# Patient Record
Sex: FEMALE | Race: ASIAN | NOT HISPANIC OR LATINO
[De-identification: names, ages, dates, MRNs, and addresses within clinical notes are randomized per-mention and may not be internally consistent; named-entity substitution may affect disease eponyms.]

---

## 2020-04-26 ENCOUNTER — MESSAGE (OUTPATIENT)
Age: 40
End: 2020-04-26

## 2021-04-13 ENCOUNTER — OUTPATIENT (OUTPATIENT)
Dept: OUTPATIENT SERVICES | Facility: HOSPITAL | Age: 41
LOS: 1 days | End: 2021-04-13
Payer: COMMERCIAL

## 2021-04-13 DIAGNOSIS — Z11.52 ENCOUNTER FOR SCREENING FOR COVID-19: ICD-10-CM

## 2021-04-13 PROCEDURE — U0003: CPT

## 2021-04-13 PROCEDURE — U0005: CPT

## 2021-04-13 PROCEDURE — C9803: CPT

## 2021-04-14 LAB — SARS-COV-2 RNA SPEC QL NAA+PROBE: SIGNIFICANT CHANGE UP

## 2021-07-21 ENCOUNTER — FORM ENCOUNTER (OUTPATIENT)
Age: 41
End: 2021-07-21

## 2021-07-22 ENCOUNTER — APPOINTMENT (OUTPATIENT)
Dept: OBGYN | Facility: CLINIC | Age: 41
End: 2021-07-22
Payer: COMMERCIAL

## 2021-07-22 ENCOUNTER — NON-APPOINTMENT (OUTPATIENT)
Age: 41
End: 2021-07-22

## 2021-07-22 ENCOUNTER — RESULT REVIEW (OUTPATIENT)
Age: 41
End: 2021-07-22

## 2021-07-22 ENCOUNTER — FORM ENCOUNTER (OUTPATIENT)
Age: 41
End: 2021-07-22

## 2021-07-22 PROCEDURE — 99214 OFFICE O/P EST MOD 30 MIN: CPT | Mod: 25

## 2021-07-22 PROCEDURE — 99386 PREV VISIT NEW AGE 40-64: CPT

## 2021-07-22 PROCEDURE — 99072 ADDL SUPL MATRL&STAF TM PHE: CPT

## 2021-07-31 ENCOUNTER — FORM ENCOUNTER (OUTPATIENT)
Age: 41
End: 2021-07-31

## 2021-08-01 ENCOUNTER — FORM ENCOUNTER (OUTPATIENT)
Age: 41
End: 2021-08-01

## 2021-08-06 ENCOUNTER — TRANSCRIPTION ENCOUNTER (OUTPATIENT)
Age: 41
End: 2021-08-06

## 2021-10-07 ENCOUNTER — FORM ENCOUNTER (OUTPATIENT)
Age: 41
End: 2021-10-07

## 2021-10-10 ENCOUNTER — FORM ENCOUNTER (OUTPATIENT)
Age: 41
End: 2021-10-10

## 2021-10-13 ENCOUNTER — FORM ENCOUNTER (OUTPATIENT)
Age: 41
End: 2021-10-13

## 2021-10-15 ENCOUNTER — RESULT REVIEW (OUTPATIENT)
Age: 41
End: 2021-10-15

## 2021-10-15 ENCOUNTER — OUTPATIENT (OUTPATIENT)
Dept: OUTPATIENT SERVICES | Facility: HOSPITAL | Age: 41
LOS: 1 days | End: 2021-10-15
Payer: COMMERCIAL

## 2021-10-15 ENCOUNTER — APPOINTMENT (OUTPATIENT)
Dept: ULTRASOUND IMAGING | Facility: CLINIC | Age: 41
End: 2021-10-15
Payer: COMMERCIAL

## 2021-10-15 DIAGNOSIS — Z00.8 ENCOUNTER FOR OTHER GENERAL EXAMINATION: ICD-10-CM

## 2021-10-15 PROCEDURE — 76856 US EXAM PELVIC COMPLETE: CPT | Mod: 26

## 2021-10-15 PROCEDURE — 76830 TRANSVAGINAL US NON-OB: CPT

## 2021-10-15 PROCEDURE — 76830 TRANSVAGINAL US NON-OB: CPT | Mod: 26

## 2021-10-15 PROCEDURE — 76856 US EXAM PELVIC COMPLETE: CPT

## 2021-10-21 DIAGNOSIS — Z78.9 OTHER SPECIFIED HEALTH STATUS: ICD-10-CM

## 2021-10-21 LAB — CYTOLOGY CVX/VAG DOC THIN PREP: NORMAL

## 2021-10-21 RX ORDER — IRON/IRON ASP GLY/FA/MV-MIN 38 125-25-1MG
TABLET ORAL
Refills: 0 | Status: ACTIVE | COMMUNITY

## 2021-10-29 ENCOUNTER — APPOINTMENT (OUTPATIENT)
Dept: OBGYN | Facility: CLINIC | Age: 41
End: 2021-10-29
Payer: COMMERCIAL

## 2021-10-29 ENCOUNTER — LABORATORY RESULT (OUTPATIENT)
Age: 41
End: 2021-10-29

## 2021-10-29 VITALS
WEIGHT: 165 LBS | HEIGHT: 63 IN | SYSTOLIC BLOOD PRESSURE: 100 MMHG | BODY MASS INDEX: 29.23 KG/M2 | DIASTOLIC BLOOD PRESSURE: 80 MMHG

## 2021-10-29 DIAGNOSIS — N92.0 EXCESSIVE AND FREQUENT MENSTRUATION WITH REGULAR CYCLE: ICD-10-CM

## 2021-10-29 DIAGNOSIS — N89.8 OTHER SPECIFIED NONINFLAMMATORY DISORDERS OF VAGINA: ICD-10-CM

## 2021-10-29 DIAGNOSIS — N93.9 ABNORMAL UTERINE AND VAGINAL BLEEDING, UNSPECIFIED: ICD-10-CM

## 2021-10-29 PROCEDURE — 99214 OFFICE O/P EST MOD 30 MIN: CPT

## 2021-10-29 RX ORDER — NORETHINDRONE ACETATE AND ETHINYL ESTRADIOL AND FERROUS FUMARATE 1.5-30(21)
1.5-3 KIT ORAL DAILY
Qty: 3 | Refills: 1 | Status: ACTIVE | COMMUNITY
Start: 2021-10-29 | End: 1900-01-01

## 2021-10-29 NOTE — PLAN
[FreeTextEntry1] : -I recommend Mirena IUD, pt hesitant, prefers to try OCP first. Oral contraceptive counseling provided to the patient.  Discussed risks and benefits, including thromboembolic events, especially in the setting of smoking, or in the setting of pre-existing medical conditions that predispose to adverse cardiovascular events.  Benign side effects reviewed as well as risk of unintended pregnancy.   Discussion regarding decreased contraceptive efficacy when taken with certain medications or when dosing schedule is erratic.  They do not protect against STI's. All questions answered.\par \par -f/u Heme recs\par -Vaginitis panel\par -RTO 6 months

## 2021-10-29 NOTE — PHYSICAL EXAM
[Appropriately responsive] : appropriately responsive [Labia Majora] : normal [Labia Minora] : normal [Normal] : normal [Uterine Adnexae] : normal

## 2021-10-29 NOTE — HISTORY OF PRESENT ILLNESS
[FreeTextEntry1] : 41 year old female present today with midcycle bleeding last month and continued heavy menses. h/o anemia. She had a pelvic US on 10/15/21 which showed 4 mm endometrial lining and normal ovaries. Her period in 9/2021 was very heavy and then in 10/2021 it came early and was very light. She notes that it's typical for her to get alternating heavy and light periods. She states that the tranexamic acid was helpful but her period is still very heavy. She has an appointment to see a hematologist 11/24/21. She has noticed an odor coming from her vagina since treatment for BV, which was evident on Pap 7/21.. Denies HTN, DM, HLD. Denies any FMHx of VTE.

## 2021-11-01 RX ORDER — METRONIDAZOLE 7.5 MG/G
0.75 GEL VAGINAL
Qty: 1 | Refills: 0 | Status: ACTIVE | COMMUNITY
Start: 2021-11-01 | End: 1900-01-01

## 2021-11-22 ENCOUNTER — OUTPATIENT (OUTPATIENT)
Dept: OUTPATIENT SERVICES | Facility: HOSPITAL | Age: 41
LOS: 1 days | Discharge: HOME | End: 2021-11-22

## 2021-11-22 DIAGNOSIS — D64.9 ANEMIA, UNSPECIFIED: ICD-10-CM

## 2021-11-24 ENCOUNTER — RESULT REVIEW (OUTPATIENT)
Age: 41
End: 2021-11-24

## 2021-11-24 ENCOUNTER — NON-APPOINTMENT (OUTPATIENT)
Age: 41
End: 2021-11-24

## 2021-11-24 ENCOUNTER — APPOINTMENT (OUTPATIENT)
Dept: HEMATOLOGY ONCOLOGY | Facility: CLINIC | Age: 41
End: 2021-11-24
Payer: COMMERCIAL

## 2021-11-24 VITALS
HEART RATE: 87 BPM | RESPIRATION RATE: 16 BRPM | WEIGHT: 175.25 LBS | DIASTOLIC BLOOD PRESSURE: 83 MMHG | HEIGHT: 62.36 IN | TEMPERATURE: 99.1 F | SYSTOLIC BLOOD PRESSURE: 134 MMHG | OXYGEN SATURATION: 99 % | BODY MASS INDEX: 31.84 KG/M2

## 2021-11-24 DIAGNOSIS — Z83.3 FAMILY HISTORY OF DIABETES MELLITUS: ICD-10-CM

## 2021-11-24 DIAGNOSIS — Z82.49 FAMILY HISTORY OF ISCHEMIC HEART DISEASE AND OTHER DISEASES OF THE CIRCULATORY SYSTEM: ICD-10-CM

## 2021-11-24 LAB
BASOPHILS # BLD AUTO: 0.13 K/UL — SIGNIFICANT CHANGE UP (ref 0–0.2)
BASOPHILS NFR BLD AUTO: 1.7 % — SIGNIFICANT CHANGE UP (ref 0–2)
EOSINOPHIL # BLD AUTO: 0.46 K/UL — SIGNIFICANT CHANGE UP (ref 0–0.5)
EOSINOPHIL NFR BLD AUTO: 6.1 % — HIGH (ref 0–6)
HCT VFR BLD CALC: 40.9 % — SIGNIFICANT CHANGE UP (ref 34.5–45)
HGB BLD-MCNC: 13.9 G/DL — SIGNIFICANT CHANGE UP (ref 11.5–15.5)
IMM GRANULOCYTES NFR BLD AUTO: 2.4 % — HIGH (ref 0–1.5)
LYMPHOCYTES # BLD AUTO: 1.97 K/UL — SIGNIFICANT CHANGE UP (ref 1–3.3)
LYMPHOCYTES # BLD AUTO: 25.9 % — SIGNIFICANT CHANGE UP (ref 13–44)
MCHC RBC-ENTMCNC: 28.5 PG — SIGNIFICANT CHANGE UP (ref 27–34)
MCHC RBC-ENTMCNC: 34 G/DL — SIGNIFICANT CHANGE UP (ref 32–36)
MCV RBC AUTO: 83.8 FL — SIGNIFICANT CHANGE UP (ref 80–100)
MONOCYTES # BLD AUTO: 0.7 K/UL — SIGNIFICANT CHANGE UP (ref 0–0.9)
MONOCYTES NFR BLD AUTO: 9.2 % — SIGNIFICANT CHANGE UP (ref 2–14)
NEUTROPHILS # BLD AUTO: 4.16 K/UL — SIGNIFICANT CHANGE UP (ref 1.8–7.4)
NEUTROPHILS NFR BLD AUTO: 54.7 % — SIGNIFICANT CHANGE UP (ref 43–77)
NRBC # BLD: 0 /100 WBCS — SIGNIFICANT CHANGE UP (ref 0–0)
PLATELET # BLD AUTO: 245 K/UL — SIGNIFICANT CHANGE UP (ref 150–400)
RBC # BLD: 4.88 M/UL — SIGNIFICANT CHANGE UP (ref 3.8–5.2)
RBC # FLD: 13.7 % — SIGNIFICANT CHANGE UP (ref 10.3–14.5)
WBC # BLD: 7.6 K/UL — SIGNIFICANT CHANGE UP (ref 3.8–10.5)
WBC # FLD AUTO: 7.6 K/UL — SIGNIFICANT CHANGE UP (ref 3.8–10.5)

## 2021-11-24 PROCEDURE — 99214 OFFICE O/P EST MOD 30 MIN: CPT

## 2021-11-24 NOTE — REVIEW OF SYSTEMS
[Negative] : Allergic/Immunologic [Dysuria] : no dysuria [Incontinence] : no incontinence [Vaginal Discharge] : no vaginal discharge [Skin Rash] : no skin rash [Skin Wound] : no skin wound [FreeTextEntry8] : heavy menstrual bleeding [de-identified] : thinning of nails and hair

## 2021-11-24 NOTE — ASSESSMENT
[FreeTextEntry1] : 42 yo F with no significant PMH who presents as a new consultation for anemia, likely iron deficiency.\par \par #Iron Deficiency Anemia\par - Likely from combination of heavy menstrual periods and diet (keto/intermittent fasting)\par - Pelvic ultrasound Oct 2021 showed no abnormalities\par - Was Hb 7.4 with MCV 66 in June 2021, started on PO iron 65 mg elemental iron, 5 tabs TID since\par - Hb improved to 9.4 in July 2021\par - Repeat CBC today shows Hb markedly improved to 13.9 with MCV 83.8\par - B12/folate normal in July\par - Iron low with %sat of 9, iron at 31, ferritin at 78.8 in July (likely normal due to already taking iron at that time)\par - Patient had been taking 15 iron pills (325 mg FS) daily. Told to stop iron except take 1 pill BID during the week when she has her menstrual period\par - Will also recheck iron studies today\par - RTC in 3-4 months for repeat CBC and iron studies\par \par Patient seen with and case discussed with Dr. Kelley\par \par Aryles Hedjar, MD, PGY-4\par Hematology/Oncology Fellow\par [facility]

## 2021-11-24 NOTE — HISTORY OF PRESENT ILLNESS
[de-identified] : 40 yo F with no significant PMH who presents as a new consultation for anemia.\par She had a Hb of 7.4 on 6/29/21 with MCV 66. She was started on PO iron, 65 mg elemental iron, taking 5 pills three times a day.\par She notes her menstrual periods have been alternatively heavy for a long time, alternating between heavy and light, although maintaining a normal cycle.\par About 3 years ago, she started on a keto diet. Then about 1.5 years ago, she changed to intermittent fasting. She generally eats chicken, salads, whole grain breads, and fruits for intermittent fasting (fasts 16 hours per day, eats 8 hours per day), but it did not seem to be helping her weight come down so now she is doing less intermittent fasting than before.\par She denies fevers, chills, nausea, vomiting, CP, SOB, diarrhea, bleeding in her stool, or weight loss.\par Her Hb improved to 9.4 in July with an MCV of 72.4. She was told to continue the PO iron.\par \par She has three children and was anemic during all those pregnancies. She was as low as a Hb ~ 6 during her first pregnancy and required iron infusions during that time, but otherwise has not had anemia. She is not sure what her blood counts were prior to June.

## 2021-11-29 LAB
FERRITIN SERPL-MCNC: 49 NG/ML
IRON SATN MFR SERPL: 93 %
IRON SERPL-MCNC: 293 UG/DL
TIBC SERPL-MCNC: 315 UG/DL
UIBC SERPL-MCNC: 22 UG/DL

## 2022-03-09 ENCOUNTER — OUTPATIENT (OUTPATIENT)
Dept: OUTPATIENT SERVICES | Facility: HOSPITAL | Age: 42
LOS: 1 days | Discharge: HOME | End: 2022-03-09

## 2022-03-09 DIAGNOSIS — D64.9 ANEMIA, UNSPECIFIED: ICD-10-CM

## 2022-03-10 ENCOUNTER — RESULT REVIEW (OUTPATIENT)
Age: 42
End: 2022-03-10

## 2022-03-10 ENCOUNTER — APPOINTMENT (OUTPATIENT)
Dept: HEMATOLOGY ONCOLOGY | Facility: CLINIC | Age: 42
End: 2022-03-10
Payer: COMMERCIAL

## 2022-03-10 VITALS
TEMPERATURE: 98.1 F | BODY MASS INDEX: 33.64 KG/M2 | HEART RATE: 78 BPM | OXYGEN SATURATION: 99 % | SYSTOLIC BLOOD PRESSURE: 142 MMHG | WEIGHT: 186.07 LBS | DIASTOLIC BLOOD PRESSURE: 92 MMHG | RESPIRATION RATE: 16 BRPM

## 2022-03-10 DIAGNOSIS — D50.9 IRON DEFICIENCY ANEMIA, UNSPECIFIED: ICD-10-CM

## 2022-03-10 LAB
BASOPHILS # BLD AUTO: 0.11 K/UL — SIGNIFICANT CHANGE UP (ref 0–0.2)
BASOPHILS NFR BLD AUTO: 1.4 % — SIGNIFICANT CHANGE UP (ref 0–2)
EOSINOPHIL # BLD AUTO: 0.35 K/UL — SIGNIFICANT CHANGE UP (ref 0–0.5)
EOSINOPHIL NFR BLD AUTO: 4.4 % — SIGNIFICANT CHANGE UP (ref 0–6)
HCT VFR BLD CALC: 40.3 % — SIGNIFICANT CHANGE UP (ref 34.5–45)
HGB BLD-MCNC: 13.3 G/DL — SIGNIFICANT CHANGE UP (ref 11.5–15.5)
IMM GRANULOCYTES NFR BLD AUTO: 0.3 % — SIGNIFICANT CHANGE UP (ref 0–1.5)
LYMPHOCYTES # BLD AUTO: 2.48 K/UL — SIGNIFICANT CHANGE UP (ref 1–3.3)
LYMPHOCYTES # BLD AUTO: 31.4 % — SIGNIFICANT CHANGE UP (ref 13–44)
MCHC RBC-ENTMCNC: 28.1 PG — SIGNIFICANT CHANGE UP (ref 27–34)
MCHC RBC-ENTMCNC: 33 G/DL — SIGNIFICANT CHANGE UP (ref 32–36)
MCV RBC AUTO: 85 FL — SIGNIFICANT CHANGE UP (ref 80–100)
MONOCYTES # BLD AUTO: 0.56 K/UL — SIGNIFICANT CHANGE UP (ref 0–0.9)
MONOCYTES NFR BLD AUTO: 7.1 % — SIGNIFICANT CHANGE UP (ref 2–14)
NEUTROPHILS # BLD AUTO: 4.38 K/UL — SIGNIFICANT CHANGE UP (ref 1.8–7.4)
NEUTROPHILS NFR BLD AUTO: 55.4 % — SIGNIFICANT CHANGE UP (ref 43–77)
NRBC # BLD: 0 /100 WBCS — SIGNIFICANT CHANGE UP (ref 0–0)
PLATELET # BLD AUTO: 271 K/UL — SIGNIFICANT CHANGE UP (ref 150–400)
RBC # BLD: 4.74 M/UL — SIGNIFICANT CHANGE UP (ref 3.8–5.2)
RBC # FLD: 13.9 % — SIGNIFICANT CHANGE UP (ref 10.3–14.5)
WBC # BLD: 7.9 K/UL — SIGNIFICANT CHANGE UP (ref 3.8–10.5)
WBC # FLD AUTO: 7.9 K/UL — SIGNIFICANT CHANGE UP (ref 3.8–10.5)

## 2022-03-10 PROCEDURE — 99213 OFFICE O/P EST LOW 20 MIN: CPT

## 2022-03-10 NOTE — ASSESSMENT
[FreeTextEntry1] : 40 yo F with no significant PMH who presents for followup for iron deficiency anemia, improved with oral iron supplementation\par Pt currently on 1 pill BID during the week when she has her menstrual period\par Hgb today is 13.3 - CBC reviewed with pt. f/u iron studies today\par She has f/u w/ PMD in 7/2022 -fax number provided for pt to send over lab results\par RTC end of the year\par All questions answered\par

## 2022-03-10 NOTE — REVIEW OF SYSTEMS
[Negative] : Allergic/Immunologic [Dysuria] : no dysuria [Incontinence] : no incontinence [Vaginal Discharge] : no vaginal discharge [Skin Rash] : no skin rash [Skin Wound] : no skin wound [FreeTextEntry8] : heavy menstrual bleeding [de-identified] : thinning of nails and hair

## 2022-03-10 NOTE — HISTORY OF PRESENT ILLNESS
[de-identified] : 42 yo F with no significant PMH who presents as a new consultation for anemia.\par She had a Hb of 7.4 on 6/29/21 with MCV 66. She was started on PO iron, 65 mg elemental iron, taking 5 pills three times a day.\par She notes her menstrual periods have been alternatively heavy for a long time, alternating between heavy and light, although maintaining a normal cycle.\par About 3 years ago, she started on a keto diet. Then about 1.5 years ago, she changed to intermittent fasting. She generally eats chicken, salads, whole grain breads, and fruits for intermittent fasting (fasts 16 hours per day, eats 8 hours per day), but it did not seem to be helping her weight come down so now she is doing less intermittent fasting than before.\par She denies fevers, chills, nausea, vomiting, CP, SOB, diarrhea, bleeding in her stool, or weight loss.\par Her Hb improved to 9.4 in July with an MCV of 72.4. She was told to continue the PO iron.\par \par She has three children and was anemic during all those pregnancies. She was as low as a Hb ~ 6 during her first pregnancy and required iron infusions during that time, but otherwise has not had anemia. She is not sure what her blood counts were prior to June. [de-identified] : She has been taking oral iron 1 pill BID during the week when she has her menstrual period. Tolerating well -no complaints. \par She has been trying to lose weight but has been unable to. Also notes continued hair thinning and brittle nails. States she had TSH checked by her PMD in July last year.

## 2022-03-11 LAB
FERRITIN SERPL-MCNC: 28 NG/ML
IRON SATN MFR SERPL: 14 %
IRON SERPL-MCNC: 47 UG/DL
TIBC SERPL-MCNC: 330 UG/DL
UIBC SERPL-MCNC: 283 UG/DL

## 2022-12-05 DIAGNOSIS — N91.2 AMENORRHEA, UNSPECIFIED: ICD-10-CM

## 2023-01-24 ENCOUNTER — APPOINTMENT (OUTPATIENT)
Dept: OBGYN | Facility: CLINIC | Age: 43
End: 2023-01-24
Payer: COMMERCIAL

## 2023-01-24 VITALS
SYSTOLIC BLOOD PRESSURE: 161 MMHG | WEIGHT: 199 LBS | HEIGHT: 62 IN | OXYGEN SATURATION: 99 % | BODY MASS INDEX: 36.62 KG/M2 | DIASTOLIC BLOOD PRESSURE: 106 MMHG | HEART RATE: 73 BPM

## 2023-01-24 DIAGNOSIS — Z11.51 ENCOUNTER FOR SCREENING FOR HUMAN PAPILLOMAVIRUS (HPV): ICD-10-CM

## 2023-01-24 DIAGNOSIS — Z86.2 PERSONAL HISTORY OF DISEASES OF THE BLOOD AND BLOOD-FORMING ORGANS AND CERTAIN DISORDERS INVOLVING THE IMMUNE MECHANISM: ICD-10-CM

## 2023-01-24 PROCEDURE — 99396 PREV VISIT EST AGE 40-64: CPT

## 2023-01-24 RX ORDER — NORGESTREL AND ETHINYL ESTRADIOL 0.3-0.03MG
0.3-3 KIT ORAL DAILY
Qty: 3 | Refills: 1 | Status: ACTIVE | COMMUNITY
Start: 2023-01-24 | End: 1900-01-01

## 2023-01-24 NOTE — HISTORY OF PRESENT ILLNESS
[Patient reported mammogram was normal] : Patient reported mammogram was normal [FreeTextEntry1] : 43 y/o  LMP 22 presents for routine annual GYN exam w/ c/o abnormal bleeding. She reports getting her period twice this month, with light bleeding on 23, and full flow menses starting 23. December period was 10 days late. Pt believes her mother had early menopause in her 40s.  Last annual on 2021, negative PAP and HPV. Last seen in 10/2021 with midcycle bleeding lasting a month and continued heavy menses. Pelvic sono was normal. Pt has a h/o anemia. She was prescribed Tranexamic acid. Saw hematology, and was prescribed oral iron. Pt has tried copper and Mirena IUD before, but c/o pelvic pressure. Her eldest child is going to [facility]. Pt reports getting a normal mammogram about 7 months ago.\par \par She works for [employer].\par \par ObHx:  x3\par GYNHx: heavy menses\par PMHx: anemia\par Allergies: NKDA [Mammogramdate] : 06/2022 [TextBox_19] : [facility] [BreastSonogramDate] : 06/2022 [TextBox_25] : [facility] [PapSmeardate] : 07/2021 [TextBox_31] : wnl

## 2023-01-24 NOTE — END OF VISIT
[FreeTextEntry2] : I, Radha Mcallister, acted as a scribe on behalf of Dr. Shannan Mensah on 01/24/2023 .\par \par All medical entries made by the scribe were at my, Dr. Shannan Mensah, direction and personally dictated by me on 01/24/2023. I have reviewed the chart and agree that the record accurately reflects my personal performance of the history, physical exam, assessment and plan. I have also personally directed, reviewed, and agreed with the chart.\par

## 2023-01-24 NOTE — PLAN
[FreeTextEntry1] : 43 y/o  presents for routine annual GYN exam. \par \par -PAP/HPV done today, pt counseled PAP may be unsatisfactory due to bleeding\par -rx mammo/sono\par -BSE\par -discussed treatment for heavy menses. I counseled the patient regarding treatment options for heavy menstrual bleeding including tranexamic acid and hormonal options, specifically combined oral contraceptive pills, patch and ring, all with option for extended cycle or continuous dosing, and levonorgestrel intrauterine device.  I reviewed the risks, potential side effects and benefits of each.\par  Pt reports bad experiences with Mirena, would like to try OCP\par -RTO in 1 year

## 2023-01-27 LAB — HPV HIGH+LOW RISK DNA PNL CVX: NOT DETECTED

## 2023-03-27 ENCOUNTER — APPOINTMENT (OUTPATIENT)
Dept: OBGYN | Facility: CLINIC | Age: 43
End: 2023-03-27
Payer: COMMERCIAL

## 2023-03-27 VITALS
BODY MASS INDEX: 36.62 KG/M2 | DIASTOLIC BLOOD PRESSURE: 85 MMHG | WEIGHT: 199 LBS | SYSTOLIC BLOOD PRESSURE: 141 MMHG | HEIGHT: 62 IN

## 2023-03-27 DIAGNOSIS — R87.615 UNSATISFACTORY CYTOLOGIC SMEAR OF CERVIX: ICD-10-CM

## 2023-03-27 PROCEDURE — 99212 OFFICE O/P EST SF 10 MIN: CPT

## 2023-03-27 NOTE — PLAN
[FreeTextEntry1] : pap collected and sent today, normal exam, no complications. \par rto for routine care.\par During this visit 15 minutes were spent face-to-face with greater than 50% of the time dedicated to counseling.\par

## 2023-03-27 NOTE — PHYSICAL EXAM
[Chaperone Declined] : Patient declined chaperone [Appropriately responsive] : appropriately responsive [Alert] : alert [No Acute Distress] : no acute distress [Oriented x3] : oriented x3 [Labia Majora] : normal [Labia Minora] : normal [Normal] : normal [Uterine Adnexae] : normal

## 2023-03-27 NOTE — HISTORY OF PRESENT ILLNESS
[FreeTextEntry1] : 44 y/o pt presents today for a re-pap. pt lmp 03/16/23. Last pap unsatisfactory due to menses done by Dr. Mensah 1/24/2023, HPV neg at that time. No hx abnormal paps. No other complaints.

## 2023-03-30 LAB — CYTOLOGY CVX/VAG DOC THIN PREP: NORMAL

## 2023-07-21 DIAGNOSIS — R92.2 INCONCLUSIVE MAMMOGRAM: ICD-10-CM

## 2023-07-21 DIAGNOSIS — Z12.39 ENCOUNTER FOR OTHER SCREENING FOR MALIGNANT NEOPLASM OF BREAST: ICD-10-CM

## 2023-11-03 ENCOUNTER — APPOINTMENT (OUTPATIENT)
Age: 43
End: 2023-11-03

## 2024-01-25 ENCOUNTER — TRANSCRIPTION ENCOUNTER (OUTPATIENT)
Age: 44
End: 2024-01-25

## 2024-01-26 ENCOUNTER — TRANSCRIPTION ENCOUNTER (OUTPATIENT)
Age: 44
End: 2024-01-26

## 2024-03-21 ENCOUNTER — APPOINTMENT (OUTPATIENT)
Dept: OBGYN | Facility: CLINIC | Age: 44
End: 2024-03-21
Payer: COMMERCIAL

## 2024-03-21 VITALS
HEIGHT: 63 IN | BODY MASS INDEX: 34.73 KG/M2 | WEIGHT: 196 LBS | DIASTOLIC BLOOD PRESSURE: 111 MMHG | SYSTOLIC BLOOD PRESSURE: 175 MMHG

## 2024-03-21 DIAGNOSIS — Z01.419 ENCOUNTER FOR GYNECOLOGICAL EXAMINATION (GENERAL) (ROUTINE) W/OUT ABNORMAL FINDINGS: ICD-10-CM

## 2024-03-21 DIAGNOSIS — Z00.00 ENCOUNTER FOR GENERAL ADULT MEDICAL EXAMINATION W/OUT ABNORMAL FINDINGS: ICD-10-CM

## 2024-03-21 DIAGNOSIS — Z12.31 ENCOUNTER FOR SCREENING MAMMOGRAM FOR MALIGNANT NEOPLASM OF BREAST: ICD-10-CM

## 2024-03-21 PROCEDURE — 99396 PREV VISIT EST AGE 40-64: CPT

## 2024-03-21 NOTE — HISTORY OF PRESENT ILLNESS
[FreeTextEntry1] : 44 year old  female presents for an annual. Last seen 2023 for annual - had AUB and unsatisfactory pap (repeat was nml) Reports irregular periods, occasionally heavy. Notes she is struggling with trying to lose weight.  OBHx:  x3 [Mammogramdate] : 07/23

## 2024-03-21 NOTE — PLAN
[FreeTextEntry1] : 44 year old female presents for an annual  -PAP utd -Check CBC and iron studies -Repeat mammo/sono in July -Advised colonoscopy, referral given to GI -pt counseled her blood pressure is extremely elevated, advised follow up with PCP ASAP  RTO in 1 year

## 2024-03-21 NOTE — END OF VISIT
[FreeTextEntry3] : I, Ange Brandyn, acted as a scribe on behalf of Dr. Shannan Mensah M.D. on 03/21/2024.  All medical entries made by the scribe were at my, Dr. Shannan Mensah M.D., direction and personally dictated by me on 03/21/2024. I have reviewed the chart and agree that the record accurately reflects my personal performance of the history, physical exam, assessment and plan. I have also personally directed, reviewed, and agreed with the chart.

## 2024-03-21 NOTE — PHYSICAL EXAM
[Appropriately responsive] : appropriately responsive [No Acute Distress] : no acute distress [Alert] : alert [Soft] : soft [Non-tender] : non-tender [Non-distended] : non-distended [No HSM] : No HSM [No Lesions] : no lesions [No Mass] : no mass [Oriented x3] : oriented x3 [Labia Majora] : normal [Labia Minora] : normal [Uterine Adnexae] : normal [Examination Of The Breasts] : a normal appearance [Normal] : normal [No Masses] : no breast masses were palpable

## 2024-03-22 LAB
BASOPHILS # BLD AUTO: 0.1 K/UL
BASOPHILS NFR BLD AUTO: 1 %
EOSINOPHIL # BLD AUTO: 0.33 K/UL
EOSINOPHIL NFR BLD AUTO: 3.2 %
FERRITIN SERPL-MCNC: 10 NG/ML
HCT VFR BLD CALC: 34.9 %
HGB BLD-MCNC: 10 G/DL
IMM GRANULOCYTES NFR BLD AUTO: 0.3 %
IRON SATN MFR SERPL: 4 %
IRON SERPL-MCNC: 16 UG/DL
LYMPHOCYTES # BLD AUTO: 2.96 K/UL
LYMPHOCYTES NFR BLD AUTO: 28.7 %
MAN DIFF?: NORMAL
MCHC RBC-ENTMCNC: 20.3 PG
MCHC RBC-ENTMCNC: 28.7 GM/DL
MCV RBC AUTO: 70.9 FL
MONOCYTES # BLD AUTO: 0.75 K/UL
MONOCYTES NFR BLD AUTO: 7.3 %
NEUTROPHILS # BLD AUTO: 6.14 K/UL
NEUTROPHILS NFR BLD AUTO: 59.5 %
PLATELET # BLD AUTO: 400 K/UL
RBC # BLD: 4.92 M/UL
RBC # FLD: 18.6 %
TIBC SERPL-MCNC: 430 UG/DL
UIBC SERPL-MCNC: 414 UG/DL
WBC # FLD AUTO: 10.31 K/UL

## 2024-04-16 ENCOUNTER — OUTPATIENT (OUTPATIENT)
Dept: OUTPATIENT SERVICES | Facility: HOSPITAL | Age: 44
LOS: 1 days | End: 2024-04-16

## 2024-04-16 ENCOUNTER — APPOINTMENT (OUTPATIENT)
Dept: INTERNAL MEDICINE | Facility: CLINIC | Age: 44
End: 2024-04-16

## 2024-05-21 DIAGNOSIS — E66.9 OBESITY, UNSPECIFIED: ICD-10-CM

## 2024-05-28 ENCOUNTER — APPOINTMENT (OUTPATIENT)
Dept: SURGERY | Facility: CLINIC | Age: 44
End: 2024-05-28
Payer: COMMERCIAL

## 2024-05-28 VITALS — HEIGHT: 63 IN | BODY MASS INDEX: 35.44 KG/M2 | WEIGHT: 200 LBS

## 2024-05-28 PROCEDURE — 99204 OFFICE O/P NEW MOD 45 MIN: CPT | Mod: 95

## 2024-05-28 NOTE — PHYSICAL EXAM
[Obese] : obese [Normal] : affect appropriate [de-identified] : Current BMI 35.4.  Limited due to telehealth visit.

## 2024-05-28 NOTE — ASSESSMENT
[FreeTextEntry1] : In summary patient is a pleasant 44-year-old female who seeks assistance for her obesity.  In summary patient is a pleasant 44-year-old female who seeks assistance for her obesity.  She presents to discuss obesity medicine options and after a discussion, we agreed to try patient on Wegovy starting at the 0.25 mg initial dose.  Sent medication to [facility] [place] for [facility] to initiate authorization.  Will see patient in 2 months for follow-up as discussed in plan.  Will see Evelyn Zambranoed registered dietitian as well.   I have discussed initiating Wegovy therapy for [place].  All risks and benefits have been discussed with the patient. Risks include but are not limited to nausea, vomiting, constipation, diarrhea, and Gi upset. Contraindications include Pancreatitis, MENS type 2 and  medullary thyroid cancer, and pregnancy. Pt. verbalize understanding and wishes to proceed with medical therapy. Instructions for use provided via office demonstration.

## 2024-05-28 NOTE — HISTORY OF PRESENT ILLNESS
[FreeTextEntry1] : Patient is a 44-year-old female who presents for initial obesity medicine consultation. Patient stands 5 feet 3 inches tall and weighs 200 pounds with a BMI of 35.4.  Her obesity has been refractory to diet and exercise efforts.  She was referred by her OB/GYN to discuss obesity medicine options.  We discussed several different options including Wegovy and Zepbound.  Patient met with our clinical pharmacist 1 month ago to discuss options.  We will try to initiate Wegovy authorization as this medicine is more likely available than Zepp bound currently.  Will start patient at the initial 0.25 mg dose and sent to [facility] at [place].  Once [facility] initiates the authorization they will contact patient and patient will start medication.  She will contact me in 1 month to discuss dose escalation and I will see her in 2 months for follow-up.  We discussed increasing physical activity to include at least 150 minutes of both cardiovascular and resistance training methods.  Patient verbalized understanding.  Will also work with our registered dietitian Evelyn Reid in which she will see him virtually to formulate a specific meal plan that fits her needs and lifestyle.

## 2024-05-30 ENCOUNTER — APPOINTMENT (OUTPATIENT)
Dept: SURGERY | Facility: CLINIC | Age: 44
End: 2024-05-30

## 2024-05-30 PROCEDURE — 97802 MEDICAL NUTRITION INDIV IN: CPT | Mod: 95

## 2024-06-18 ENCOUNTER — APPOINTMENT (OUTPATIENT)
Dept: SURGERY | Facility: CLINIC | Age: 44
End: 2024-06-18

## 2024-06-18 PROCEDURE — 97802 MEDICAL NUTRITION INDIV IN: CPT | Mod: 95

## 2024-06-21 RX ORDER — SEMAGLUTIDE 1 MG/.5ML
1 INJECTION, SOLUTION SUBCUTANEOUS
Qty: 4 | Refills: 0 | Status: ACTIVE | COMMUNITY
Start: 2024-05-28 | End: 1900-01-01

## 2024-08-14 ENCOUNTER — APPOINTMENT (OUTPATIENT)
Dept: SURGERY | Facility: CLINIC | Age: 44
End: 2024-08-14

## 2024-08-14 PROCEDURE — 97802 MEDICAL NUTRITION INDIV IN: CPT | Mod: 95

## 2024-08-15 ENCOUNTER — TRANSCRIPTION ENCOUNTER (OUTPATIENT)
Age: 44
End: 2024-08-15

## 2024-08-15 DIAGNOSIS — Z12.31 ENCOUNTER FOR SCREENING MAMMOGRAM FOR MALIGNANT NEOPLASM OF BREAST: ICD-10-CM

## 2024-08-23 ENCOUNTER — APPOINTMENT (OUTPATIENT)
Dept: SURGERY | Facility: CLINIC | Age: 44
End: 2024-08-23
Payer: COMMERCIAL

## 2024-08-23 VITALS — BODY MASS INDEX: 33.66 KG/M2 | WEIGHT: 190 LBS | HEIGHT: 63 IN

## 2024-08-23 PROCEDURE — 99213 OFFICE O/P EST LOW 20 MIN: CPT

## 2024-08-23 NOTE — HISTORY OF PRESENT ILLNESS
[Home] : at home, [unfilled] , at the time of the visit. [Medical Office: ([facility])___] : at the medical office located in  [Verbal consent obtained from patient] : the patient, [unfilled] [FreeTextEntry1] : Patient is a 44-year-old female who presented for initial obesity medicine consultation. Patient stands 5 feet 3 inches tall and weighed 200 pounds with a BMI of 35.4. Her obesity has been refractory to diet and exercise efforts. She was referred by her OB/GYN to discuss obesity medicine options.  We discussed several different options including Wegovy and Zepbound  Patient was approved for Wegovy and currently tolerating 1 mg dose.  Patient is down 15 pounds since starting Wegovy 2 months ago.  Currently tolerating 1 mg dose.  She has 3 additional weeks at 1 mg and then would like to increase to 1.7 mg.  Her current weight is 190 pounds with a current BMI of 33.66.  She denies negative side effects other than is self-limiting transient nausea which is mitigated by drinking cold water.

## 2024-08-23 NOTE — ASSESSMENT
[FreeTextEntry1] : In summary patient presents for obesity medicine follow-up.  She was prescribed Wegovy and currently tolerating 1 mg dose.  Will increase to 1.7 mg starting next month.  Patient has an upcoming appointment with her PCP on September 8 and will obtain complete blood work in which she will fax results to my office for review.  Will continue to increase her physical activity as tolerated to a minimum recommended 150 minutes/week of both cardiovascular and weight training methods.  Patient will continue to work on decrease overall calories and focus on lean high-quality proteins with decrease carbohydrates and fats.  Patient reports significant decrease in food cravings in which she is quite happy about.  All questions and concerns answered at today's visit.  Will see in 2 months for follow-up.

## 2024-08-23 NOTE — HISTORY OF PRESENT ILLNESS
[Home] : at home, [unfilled] , at the time of the visit. [Medical Office: ([facility])___] : at the medical office located in  [Verbal consent obtained from patient] : the patient, [unfilled] [FreeTextEntry1] : Patient is a 44-year-old female who presented for initial obesity medicine consultation. Patient stands 5 feet 3 inches tall and weighed 200 pounds with a BMI of 35.4. Her obesity has been refractory to diet and exercise efforts. She was referred by her OB/GYN to discuss obesity medicine options.  We discussed several different options including Wegovy and Zepbound  Patient was approved for Wegovy and currently tolerating 1 mg dose.  Patient is down 15 pounds since starting Wegovy 2 months ago.  Currently tolerating 1 mg dose.  She has 3 additional weeks at 1 mg and then would like to increase to 1.7 mg.  Her current weight is 190 pounds with a current BMI of 33.66.  She denies negative side effects other than is self-limiting transient nausea which is mitigated by drinking cold water.

## 2024-10-05 NOTE — PHYSICAL EXAM
[Appropriately responsive] : appropriately responsive [Alert] : alert [No Acute Distress] : no acute distress [Soft] : soft [Non-tender] : non-tender [Non-distended] : non-distended [No HSM] : No HSM [No Lesions] : no lesions [No Mass] : no mass [Oriented x3] : oriented x3 none known [Examination Of The Breasts] : a normal appearance [No Masses] : no breast masses were palpable [Labia Majora] : normal [Labia Minora] : normal [Normal] : normal [Uterine Adnexae] : normal

## 2024-10-28 ENCOUNTER — APPOINTMENT (OUTPATIENT)
Dept: SURGERY | Facility: CLINIC | Age: 44
End: 2024-10-28
Payer: COMMERCIAL

## 2024-10-28 PROCEDURE — 97803 MED NUTRITION INDIV SUBSEQ: CPT | Mod: 95

## 2024-11-05 ENCOUNTER — APPOINTMENT (OUTPATIENT)
Dept: SURGERY | Facility: CLINIC | Age: 44
End: 2024-11-05
Payer: COMMERCIAL

## 2024-11-05 VITALS — BODY MASS INDEX: 31.36 KG/M2 | WEIGHT: 177 LBS | HEIGHT: 63 IN

## 2024-11-05 PROCEDURE — 99213 OFFICE O/P EST LOW 20 MIN: CPT

## 2024-11-22 ENCOUNTER — RX RENEWAL (OUTPATIENT)
Age: 44
End: 2024-11-22

## 2025-01-16 ENCOUNTER — APPOINTMENT (OUTPATIENT)
Dept: SURGERY | Facility: CLINIC | Age: 45
End: 2025-01-16

## 2025-01-16 PROCEDURE — 97803 MED NUTRITION INDIV SUBSEQ: CPT | Mod: 95

## 2025-01-29 ENCOUNTER — APPOINTMENT (OUTPATIENT)
Dept: SURGERY | Facility: CLINIC | Age: 45
End: 2025-01-29
Payer: COMMERCIAL

## 2025-01-29 VITALS — WEIGHT: 161 LBS | HEIGHT: 63 IN | BODY MASS INDEX: 28.53 KG/M2

## 2025-01-29 PROCEDURE — 99213 OFFICE O/P EST LOW 20 MIN: CPT | Mod: 95

## 2025-02-10 ENCOUNTER — RX RENEWAL (OUTPATIENT)
Age: 45
End: 2025-02-10

## 2025-03-18 ENCOUNTER — RX RENEWAL (OUTPATIENT)
Age: 45
End: 2025-03-18

## 2025-04-11 ENCOUNTER — APPOINTMENT (OUTPATIENT)
Dept: OBGYN | Facility: CLINIC | Age: 45
End: 2025-04-11
Payer: COMMERCIAL

## 2025-04-11 VITALS
BODY MASS INDEX: 27.64 KG/M2 | HEIGHT: 63 IN | SYSTOLIC BLOOD PRESSURE: 162 MMHG | WEIGHT: 156 LBS | DIASTOLIC BLOOD PRESSURE: 99 MMHG

## 2025-04-11 DIAGNOSIS — Z01.419 ENCOUNTER FOR GYNECOLOGICAL EXAMINATION (GENERAL) (ROUTINE) W/OUT ABNORMAL FINDINGS: ICD-10-CM

## 2025-04-11 LAB
BASOPHILS # BLD AUTO: 0.12 K/UL
BASOPHILS NFR BLD AUTO: 1.4 %
EOSINOPHIL # BLD AUTO: 0.45 K/UL
EOSINOPHIL NFR BLD AUTO: 5.4 %
FERRITIN SERPL-MCNC: 9 NG/ML
HCT VFR BLD CALC: 36.3 %
HGB BLD-MCNC: 11.2 G/DL
IMM GRANULOCYTES NFR BLD AUTO: 0.1 %
IRON SATN MFR SERPL: 18 %
IRON SERPL-MCNC: 61 UG/DL
LYMPHOCYTES # BLD AUTO: 2.54 K/UL
LYMPHOCYTES NFR BLD AUTO: 30.6 %
MAN DIFF?: NORMAL
MCHC RBC-ENTMCNC: 23.6 PG
MCHC RBC-ENTMCNC: 30.9 G/DL
MCV RBC AUTO: 76.4 FL
MONOCYTES # BLD AUTO: 0.61 K/UL
MONOCYTES NFR BLD AUTO: 7.3 %
NEUTROPHILS # BLD AUTO: 4.58 K/UL
NEUTROPHILS NFR BLD AUTO: 55.2 %
PLATELET # BLD AUTO: 328 K/UL
RBC # BLD: 4.75 M/UL
RBC # FLD: 17.4 %
TIBC SERPL-MCNC: 339 UG/DL
UIBC SERPL-MCNC: 279 UG/DL
WBC # FLD AUTO: 8.31 K/UL

## 2025-04-11 PROCEDURE — 99396 PREV VISIT EST AGE 40-64: CPT

## 2025-04-11 PROCEDURE — 36415 COLL VENOUS BLD VENIPUNCTURE: CPT

## 2025-04-12 RX ORDER — TRANEXAMIC ACID 650 MG/1
650 TABLET ORAL
Qty: 15 | Refills: 5 | Status: ACTIVE | COMMUNITY
Start: 2025-04-12 | End: 1900-01-01

## 2025-04-28 ENCOUNTER — NON-APPOINTMENT (OUTPATIENT)
Age: 45
End: 2025-04-28

## 2025-06-05 ENCOUNTER — APPOINTMENT (OUTPATIENT)
Dept: SURGERY | Facility: CLINIC | Age: 45
End: 2025-06-05

## 2025-06-05 VITALS — BODY MASS INDEX: 27.64 KG/M2 | WEIGHT: 156 LBS | HEIGHT: 63 IN

## 2025-06-05 PROCEDURE — 99214 OFFICE O/P EST MOD 30 MIN: CPT | Mod: 95

## 2025-06-05 RX ORDER — TIRZEPATIDE 10 MG/.5ML
10 INJECTION, SOLUTION SUBCUTANEOUS
Qty: 2 | Refills: 2 | Status: ACTIVE | COMMUNITY
Start: 2025-06-05 | End: 1900-01-01

## 2025-08-19 ENCOUNTER — RX RENEWAL (OUTPATIENT)
Age: 45
End: 2025-08-19

## 2025-08-27 DIAGNOSIS — R92.8 OTHER ABNORMAL AND INCONCLUSIVE FINDINGS ON DIAGNOSTIC IMAGING OF BREAST: ICD-10-CM

## 2025-08-28 ENCOUNTER — TRANSCRIPTION ENCOUNTER (OUTPATIENT)
Age: 45
End: 2025-08-28

## 2025-09-15 ENCOUNTER — RX RENEWAL (OUTPATIENT)
Age: 45
End: 2025-09-15